# Patient Record
Sex: FEMALE | URBAN - NONMETROPOLITAN AREA
[De-identification: names, ages, dates, MRNs, and addresses within clinical notes are randomized per-mention and may not be internally consistent; named-entity substitution may affect disease eponyms.]

---

## 2020-04-15 ENCOUNTER — APPOINTMENT (OUTPATIENT)
Age: 35
Setting detail: DERMATOLOGY
End: 2020-04-15

## 2020-04-15 DIAGNOSIS — L259 CONTACT DERMATITIS AND OTHER ECZEMA, UNSPECIFIED CAUSE: ICD-10-CM

## 2020-04-15 DIAGNOSIS — L28.0 LICHEN SIMPLEX CHRONICUS: ICD-10-CM

## 2020-04-15 PROBLEM — L23.9 ALLERGIC CONTACT DERMATITIS, UNSPECIFIED CAUSE: Status: ACTIVE | Noted: 2020-04-15

## 2020-04-15 PROCEDURE — OTHER PRESCRIPTION: OTHER

## 2020-04-15 PROCEDURE — OTHER COUNSELING: OTHER

## 2020-04-15 PROCEDURE — 99201: CPT

## 2020-04-15 RX ORDER — ALCLOMETASONE DIPROPIONATE 0.5 MG/G
CREAM TOPICAL
Qty: 1 | Refills: 0 | Status: ERX | COMMUNITY
Start: 2020-04-15

## 2020-04-15 RX ORDER — BETAMETHASONE VALERATE 1 MG/G
CREAM TOPICAL
Qty: 1 | Refills: 1 | Status: ERX | COMMUNITY
Start: 2020-04-15

## 2020-04-17 ENCOUNTER — RX ONLY (RX ONLY)
Age: 35
End: 2020-04-17

## 2020-04-17 RX ORDER — ALCLOMETASONE DIPROPIONATE 0.5 MG/G
CREAM TOPICAL
Qty: 1 | Refills: 1 | Status: ERX

## 2020-04-17 RX ORDER — BETAMETHASONE DIPROPIONATE 0.5 MG/G
CREAM TOPICAL
Qty: 1 | Refills: 1 | Status: ERX | COMMUNITY
Start: 2020-04-17

## 2020-06-26 ENCOUNTER — HOSPITAL ENCOUNTER (OUTPATIENT)
Dept: HOSPITAL 47 - OR | Age: 35
Discharge: HOME | End: 2020-06-26
Attending: OBSTETRICS & GYNECOLOGY
Payer: COMMERCIAL

## 2020-06-26 VITALS — DIASTOLIC BLOOD PRESSURE: 79 MMHG | HEART RATE: 84 BPM | SYSTOLIC BLOOD PRESSURE: 110 MMHG

## 2020-06-26 VITALS — BODY MASS INDEX: 35.4 KG/M2

## 2020-06-26 VITALS — TEMPERATURE: 97.6 F

## 2020-06-26 VITALS — RESPIRATION RATE: 16 BRPM

## 2020-06-26 DIAGNOSIS — G47.30: ICD-10-CM

## 2020-06-26 DIAGNOSIS — E28.2: ICD-10-CM

## 2020-06-26 DIAGNOSIS — J45.909: ICD-10-CM

## 2020-06-26 DIAGNOSIS — O02.1: Primary | ICD-10-CM

## 2020-06-26 DIAGNOSIS — Z82.49: ICD-10-CM

## 2020-06-26 DIAGNOSIS — Z79.899: ICD-10-CM

## 2020-06-26 DIAGNOSIS — Z98.890: ICD-10-CM

## 2020-06-26 DIAGNOSIS — Z90.49: ICD-10-CM

## 2020-06-26 DIAGNOSIS — G25.81: ICD-10-CM

## 2020-06-26 DIAGNOSIS — Z88.6: ICD-10-CM

## 2020-06-26 LAB
BASOPHILS # BLD AUTO: 0.1 K/UL (ref 0–0.2)
BASOPHILS NFR BLD AUTO: 1 %
EOSINOPHIL # BLD AUTO: 0.1 K/UL (ref 0–0.7)
EOSINOPHIL NFR BLD AUTO: 1 %
ERYTHROCYTE [DISTWIDTH] IN BLOOD BY AUTOMATED COUNT: 5.13 M/UL (ref 3.8–5.4)
ERYTHROCYTE [DISTWIDTH] IN BLOOD: 12.6 % (ref 11.5–15.5)
HCT VFR BLD AUTO: 43 % (ref 34–46)
HGB BLD-MCNC: 14.3 GM/DL (ref 11.4–16)
LYMPHOCYTES # SPEC AUTO: 2 K/UL (ref 1–4.8)
LYMPHOCYTES NFR SPEC AUTO: 25 %
MCH RBC QN AUTO: 27.9 PG (ref 25–35)
MCHC RBC AUTO-ENTMCNC: 33.2 G/DL (ref 31–37)
MCV RBC AUTO: 84 FL (ref 80–100)
MONOCYTES # BLD AUTO: 0.4 K/UL (ref 0–1)
MONOCYTES NFR BLD AUTO: 5 %
NEUTROPHILS # BLD AUTO: 5.3 K/UL (ref 1.3–7.7)
NEUTROPHILS NFR BLD AUTO: 66 %
PLATELET # BLD AUTO: 268 K/UL (ref 150–450)
WBC # BLD AUTO: 8.1 K/UL (ref 3.8–10.6)

## 2020-06-26 PROCEDURE — 88305 TISSUE EXAM BY PATHOLOGIST: CPT

## 2020-06-26 PROCEDURE — 59820 CARE OF MISCARRIAGE: CPT

## 2020-06-26 PROCEDURE — 85025 COMPLETE CBC W/AUTO DIFF WBC: CPT

## 2020-06-26 NOTE — P.OP
Date of Procedure: 20


Preoperative Diagnosis: 


#1.  8+ week missed 


Postoperative Diagnosis: 


Same


Procedure(s) Performed: 


#1.  Dilation and aspiration curettage


Anesthesia: MAYLIN


Surgeon: Musa Warner


Estimated Blood Loss (ml): 200


IV fluids (ml): 700


Urine output (ml): 20


Pathology: other (Endometrial contents)


Condition: stable


Disposition: PACU


Operative Findings: 


Preoperative pelvic examination demonstrated a roughly 9 week slightly retr

overted mobile normal shaped uterus.  There was also noted to be fairly 

significant prolapse with the cervix at just under grade 3 descensus.  The 

uterus sounded to 11 cm.  The #9 curved aspiration curet was utilized and tissue

was clearly seen passing through the tubing.  There was no tissue with a sharp 

curette or on the final pass of the aspiration curet and the typical gritty 

texture was encountered throughout.  The patient is an excellent candidate for 

vaginal instructed he should it ever become necessary.


Description of Procedure: 


The patient was prepped and draped in usual fashion after general endotracheal 

anesthesia was administered by the anesthesiologist.  A weighted speculum was 

placed and the bladder drained of approximately 20 mL of clear jose urine.  A 

single-tooth tenaculum was applied to the anterior lip of the cervix and uterus 

sounded to 11 cm as noted above.  Serial dilation was carried out to admit a #9 

curved aspiration curet which was placed to the fundus of the uterus and suction

applied.  After adequate suction had been built, thorough circumferential 

aspiration curettage was carried out from fundus to cervix with tissue clearly s

een passing through the tubing.  A second pass was made with the aspiration 

curet at which time no tissue seen passing through the tubing.  It was then set 

aside for moment in favor of a medium sharp curet which was utilized to 

thoroughly and circumferentially curet the in vitro cavity with the typical 

gritty texture encountered throughout.  No tissue was returned.  One last pass 

was made with the aspiration curet was time no further tissue was noted.  All 

instrumentation was removed.  The uterus was appreciably smaller following the 

procedure.  There was no ongoing bleeding from the cervix.  Estimated blood loss

for the case is approximately 200 mL.  There were no complications.  All sponge,

instrument, and needle counts were correct.  The patient tolerated the procedure

well and proceeded to the recovery room in stable condition.

## 2021-05-24 ENCOUNTER — HOSPITAL ENCOUNTER (OUTPATIENT)
Dept: HOSPITAL 47 - FBPOP | Age: 36
Discharge: HOME | End: 2021-05-24
Attending: OBSTETRICS & GYNECOLOGY
Payer: COMMERCIAL

## 2021-05-24 VITALS
DIASTOLIC BLOOD PRESSURE: 79 MMHG | HEART RATE: 112 BPM | RESPIRATION RATE: 16 BRPM | SYSTOLIC BLOOD PRESSURE: 133 MMHG | TEMPERATURE: 98.4 F

## 2021-05-24 DIAGNOSIS — Z3A.32: ICD-10-CM

## 2021-05-24 DIAGNOSIS — O26.93: Primary | ICD-10-CM

## 2021-05-24 PROCEDURE — 59025 FETAL NON-STRESS TEST: CPT

## 2021-05-24 PROCEDURE — 99213 OFFICE O/P EST LOW 20 MIN: CPT

## 2021-06-20 NOTE — P.MSEPDOC
Presenting Problems





- Arrival Data


Date of Arrival on Unit: 21


Time of Arrival on Unit: 10:15


Mode of Transport: Ambulatory





- Complaint


OB-Reason for Admission/Chief Complaint: NST





Prenatal Medical History





- Pregnancy Information


: 6


Para: 3


Term: 3


: 0


Abortions: Spontaneous or Elective: 2


Number of Living Children: 3





- Gestational Age


Gestational Age by SHAHAB (wks/days): 32 Weeks and 5 Days





- Prenatal History


Pregnancy Complications: GDM





Review of Systems





- Review of Systems


Constitutional: No problems


Breast: No problems


ENT: No problems


Cardiovascular: No problems


Respiratory: No problems


Gastrointestinal: No problems


Genitourinary: No problems


Musculoskeletal: No problems


Neurological: No problems


Skin: No problems





Vital Signs





- Temperature


Temperature: 98.4 F


Temperature Source: Oral





- Pulse


  ** Right Sitting


Pulse Rate: 112


Pulse Assessment Method: Automatic Cuff





- Respirations


Respiratory Rate: 16


Oxygen Delivery Method: Room Air





- Blood Pressure


  ** Right Arm


Blood Pressure: 133/79


Blood Pressure Mean: 97


Blood Pressure Source: Automatic Cuff





Medical Screen Scoring





- Fetal Assessment - Baby A


Baseline FHR: 130





Physician Notification





- Physician Notified


Physician Notified Date: 21


Physician Notified Time: 12:06


Physician: Radha Wilhelm Order Received: Yes (D/C home)





Disposition





- Disposition


OB Disposition: Discharge to home


Discharge Date: 21


Discharge Time: 12:06


I agree with the RN Medical Screening Exam: Yes


Physician's MSE Comment: 





I have neither seen nor examined the patient.


Case reviewed; plan agreed upon as documented in EMR&OBIX.: Yes


Diagnosis: PREGNANCY RELATED CONDITIONS, UNSPECIFIED, THIRD TRIMESTER

## 2021-07-12 ENCOUNTER — HOSPITAL ENCOUNTER (INPATIENT)
Dept: HOSPITAL 47 - 4FBP | Age: 36
LOS: 1 days | Discharge: HOME | End: 2021-07-13
Attending: OBSTETRICS & GYNECOLOGY | Admitting: OBSTETRICS & GYNECOLOGY
Payer: COMMERCIAL

## 2021-07-12 DIAGNOSIS — J45.909: ICD-10-CM

## 2021-07-12 DIAGNOSIS — O36.63X0: ICD-10-CM

## 2021-07-12 DIAGNOSIS — Z3A.39: ICD-10-CM

## 2021-07-12 DIAGNOSIS — O99.613: ICD-10-CM

## 2021-07-12 DIAGNOSIS — K21.9: ICD-10-CM

## 2021-07-12 DIAGNOSIS — O99.513: ICD-10-CM

## 2021-07-12 DIAGNOSIS — Z87.442: ICD-10-CM

## 2021-07-12 DIAGNOSIS — O40.3XX0: ICD-10-CM

## 2021-07-12 LAB
BASOPHILS # BLD AUTO: 0 K/UL (ref 0–0.2)
BASOPHILS NFR BLD AUTO: 0 %
EOSINOPHIL # BLD AUTO: 0.1 K/UL (ref 0–0.7)
EOSINOPHIL NFR BLD AUTO: 1 %
ERYTHROCYTE [DISTWIDTH] IN BLOOD BY AUTOMATED COUNT: 4.38 M/UL (ref 3.8–5.4)
ERYTHROCYTE [DISTWIDTH] IN BLOOD: 13.5 % (ref 11.5–15.5)
GLUCOSE BLD-MCNC: 80 MG/DL (ref 75–99)
GLUCOSE BLD-MCNC: 83 MG/DL (ref 75–99)
GLUCOSE BLD-MCNC: 83 MG/DL (ref 75–99)
GLUCOSE BLD-MCNC: 86 MG/DL (ref 75–99)
HCT VFR BLD AUTO: 34.3 % (ref 34–46)
HGB BLD-MCNC: 11.7 GM/DL (ref 11.4–16)
LYMPHOCYTES # SPEC AUTO: 2.3 K/UL (ref 1–4.8)
LYMPHOCYTES NFR SPEC AUTO: 23 %
MCH RBC QN AUTO: 26.7 PG (ref 25–35)
MCHC RBC AUTO-ENTMCNC: 34 G/DL (ref 31–37)
MCV RBC AUTO: 78.5 FL (ref 80–100)
MONOCYTES # BLD AUTO: 0.7 K/UL (ref 0–1)
MONOCYTES NFR BLD AUTO: 7 %
NEUTROPHILS # BLD AUTO: 6.6 K/UL (ref 1.3–7.7)
NEUTROPHILS NFR BLD AUTO: 67 %
PLATELET # BLD AUTO: 250 K/UL (ref 150–450)
WBC # BLD AUTO: 9.9 K/UL (ref 3.8–10.6)

## 2021-07-12 PROCEDURE — 85025 COMPLETE CBC W/AUTO DIFF WBC: CPT

## 2021-07-12 PROCEDURE — 86901 BLOOD TYPING SEROLOGIC RH(D): CPT

## 2021-07-12 PROCEDURE — 86850 RBC ANTIBODY SCREEN: CPT

## 2021-07-12 PROCEDURE — 3E033VJ INTRODUCTION OF OTHER HORMONE INTO PERIPHERAL VEIN, PERCUTANEOUS APPROACH: ICD-10-PCS

## 2021-07-12 PROCEDURE — 86900 BLOOD TYPING SEROLOGIC ABO: CPT

## 2021-07-12 PROCEDURE — 10907ZC DRAINAGE OF AMNIOTIC FLUID, THERAPEUTIC FROM PRODUCTS OF CONCEPTION, VIA NATURAL OR ARTIFICIAL OPENING: ICD-10-PCS

## 2021-07-12 RX ADMIN — DOCUSATE SODIUM AND SENNOSIDES SCH EACH: 50; 8.6 TABLET ORAL at 19:33

## 2021-07-12 RX ADMIN — POTASSIUM CHLORIDE SCH MLS/HR: 14.9 INJECTION, SOLUTION INTRAVENOUS at 13:12

## 2021-07-12 RX ADMIN — IBUPROFEN SCH: 600 TABLET ORAL at 18:33

## 2021-07-12 RX ADMIN — POTASSIUM CHLORIDE SCH MLS/HR: 14.9 INJECTION, SOLUTION INTRAVENOUS at 06:48

## 2021-07-12 RX ADMIN — ACETAMINOPHEN PRN MG: 325 TABLET, FILM COATED ORAL at 19:33

## 2021-07-12 NOTE — P.HPOB
History of Present Illness


H&P Date: 21


Chief Complaint: 39+ weeks, induction of labor





The patient is a 36-year-old  6 para 30-3 admitted at 39+ weeks as 

established by last menstrual period and confirmed by 9 week ultrasound.  She is

admitted for elective induction of labor with all signs reassuring.  Her 

pregnancy has been uncomplicated by advanced maternal age with the patient 

having declined  testing.  She additionally was found to be a 

gestational diabetic which ultimately required insulin to control but was well-

controlled throughout the pregnancy.  She has a history of nephrolithiasis with 

1 episode during the pregnancy as well.  She is additionally found to have the 

fetus at the 90th percentile growth in the third trimester and additionally had 

polyhydramnios.   testing throughout the pregnancy has been reassuring.

 Group B strep status is negative.





Obstetrical history  6 para 30-3 with 3 term vaginal deliveries and 2 

early miscarriages.  Current pregnancy statistics are listed in history present 

illness.  EDC of 2021 was established by last menstrual period and 

confirmed by 9 week ultrasound.  Laboratory workup demonstrates a blood type of 

O+ with a negative antibody screen.  Rubella status is immune.  Remainder of the

laboratory workup was within normal limits.  Early Glucola was elevated and 

followed by an abnormal three-hour glucose tolerance test making the diagnosis 

of gestational diabetes.  Group B strep status is negative.





Gynecologist history: Unremarkable with no history of any infections to include 

STDs.





Review of Systems





Review of systems is confined to history of present illness.





Past Medical History


Past Medical History: Asthma, GERD/Reflux


Additional Past Medical History / Comment(s): kidney stones,


History of Any Multi-Drug Resistant Organisms: None Reported


Past Surgical History: Adenoidectomy, Cholecystectomy, Ear Surgery


Additional Past Surgical History / Comment(s): BMT


Past Anesthesia/Blood Transfusion Reactions: Motion Sickness, Postoperative 

Nausea & Vomiting (PONV)


Past Psychological History: No Psychological Hx Reported


Smoking Status: Never smoker


Past Alcohol Use History: None Reported


Past Drug Use History: None Reported





- Past Family History


  ** Sister(s)


Family Medical History: Pulmonary Embolus


Additional Family Medical History / Comment(s): Stroke





  ** Father


Family Medical History: Cancer


Additional Family Medical History / Comment(s): Throat





Medications and Allergies


                                Home Medications











 Medication  Instructions  Recorded  Confirmed  Type


 


Insulin Aspart [NovoLOG] 0 units SQ ACHS 21 History


 


Insulin Glargine [Lantus] 12 unit SQ DAILY 21 History


 


Pnv No.95/Ferrous Fum/Folic AC 1 tab PO DAILY 21 History





[Prenatal Multivitamin Tablet]    








                                    Allergies











Allergy/AdvReac Type Severity Reaction Status Date / Time


 


NSAIDS (Non-Steroidal AdvReac  Abdominal Verified 21 07:00





Anti-Inflamma   Pain  














Exam


                                   Vital Signs











  Temp Pulse Resp BP Pulse Ox


 


 21 06:33  97.1 F L  104 H  16  132/72  99








                                Intake and Output











 21





 22:59 06:59 14:59


 


Other:   


 


  Weight  92.986 kg 














In general, this is a well-developed, well-nourished white female in no acute 

distress.  Her heart has a regular rhythm and rate without murmur.  Her lungs 

are clear to auscultation bilaterally in all fields.  Her abdomen is gravid, 

nondistended, has normal active bowel sounds, soft, nontender, and without any 

palpable masses aside from uterine fundus.  Her extremities without any 

cyanosis, clubbing, or significant edema and are nontender to palpation 

bilaterally.  Digital cervical examination demonstrates her cervix to be 3+ 

centimeters dilated, approximately 50% effaced, the vertex in presentation at -2

station.  Artificial rupture of membranes is carried out demonstrating clear to 

lightly meconium-stained fluid.





Results


Result Diagrams: 


                                 21 06:45





                  Abnormal Lab Results - Last 24 Hours (Table)











  21 Range/Units





  06:45 


 


MCV  78.5 L  (80.0-100.0)  fL














Assessment and Plan


(1) Polyhydramnios


Current Visit: Yes   Status: Acute   Code(s): O40.9XX0 - POLYHYDRAMNIOS, UNSP 

TRIMESTER, NOT APPLICABLE OR UNSP   SNOMED Code(s): 58870805


   





(2) Term pregnancy


Current Visit: Yes   Status: Acute   Code(s): Z34.90 - ENCNTR FOR SUPRVSN OF 

NORMAL PREGNANCY, UNSP, UNSP TRIMESTER   SNOMED Code(s): 14746018


   





(3) Fetal macrosomia in pregnancy


Current Visit: Yes   Status: Acute   Code(s): O36.60X0 - MATERNAL CARE FOR 

EXCESS FETAL GROWTH, UNSP TRIMESTER, UNSP   SNOMED Code(s): 96012829


   





(4) Gestational diabetes


Current Visit: Yes   Status: Acute   Code(s): O24.419 - GESTATIONAL DIABETES 

MELLITUS IN PREGNANCY, UNSP CONTROL   SNOMED Code(s): 62047465


   


Plan: 





The patient has had Pitocin augmentation started.  She has undergone artificial 

rupture of membranes.  She will have close maternal and fetal surveillance and 

expectant management will be practiced.  Given her diabetes, she will have blood

sugars monitored every 2 hours or closer necessary.  She is a good candidate for

either IV or epidural analgesia.  I would anticipate normal spontaneous vaginal 

delivery sometime this afternoon.

## 2021-07-12 NOTE — P.PROBDLV
Vaginal Delivery Note





- .


Vaginal Delivery Note: 





The patient is a 36-year-old  6 para 3023 admitted at 39-5/7 weeks by 

good dating parameters.  She is admitted for elective induction of labor with 

all signs reassuring.  Her pregnancy has been complicated by advanced maternal 

age though she declined any  trisomy testing.  She additionally was 

found to have gestational diabetes which ultimately required insulin.  

testing was reassuring throughout the third trimester.  Group E strep status is 

negative.  On labor and delivery, she had Pitocin started followed by artificial

rupture of membranes for clear fluid.  She made progress to the active phase of 

labor and had an epidural catheter placed for analgesia.  She then progressed 

through the active phase to complete.  She had a trial of pushing but was 

somewhat ineffective secondary to the density of the epidural.  As a result, the

epidural was discontinued and the patient began to push again.  She pushed over 

the course of approximately 45 minutes to a normal spontaneous vaginal delivery 

of a viable 9 lbs. 1 oz. baby boy with Apgars of 9 at 1 minute and 9 at 5 

minutes delivered in the left occiput anterior position.  The placenta was 

delivered spontaneously, intact, and grossly normal with a grossly normal three-

vessel cord inserted approximate 5 cm from the margin of the placental disc.  

There were no lacerations of the perineum, vagina, or cervix.  Estimated blood 

loss for the case was approximately 100 mL.  There were no complications.  All 

sponge, instrument, needle counts were correct.  Both mother and infant are 

resting comfortably in recovery.

## 2021-07-13 VITALS — HEART RATE: 71 BPM | DIASTOLIC BLOOD PRESSURE: 69 MMHG | SYSTOLIC BLOOD PRESSURE: 98 MMHG | RESPIRATION RATE: 18 BRPM

## 2021-07-13 VITALS — TEMPERATURE: 98 F

## 2021-07-13 LAB
BASOPHILS # BLD AUTO: 0 K/UL (ref 0–0.2)
BASOPHILS NFR BLD AUTO: 0 %
EOSINOPHIL # BLD AUTO: 0.1 K/UL (ref 0–0.7)
EOSINOPHIL NFR BLD AUTO: 1 %
ERYTHROCYTE [DISTWIDTH] IN BLOOD BY AUTOMATED COUNT: 3.98 M/UL (ref 3.8–5.4)
ERYTHROCYTE [DISTWIDTH] IN BLOOD: 13.6 % (ref 11.5–15.5)
HCT VFR BLD AUTO: 31.4 % (ref 34–46)
HGB BLD-MCNC: 10.8 GM/DL (ref 11.4–16)
LYMPHOCYTES # SPEC AUTO: 2.1 K/UL (ref 1–4.8)
LYMPHOCYTES NFR SPEC AUTO: 20 %
MCH RBC QN AUTO: 27.2 PG (ref 25–35)
MCHC RBC AUTO-ENTMCNC: 34.5 G/DL (ref 31–37)
MCV RBC AUTO: 78.9 FL (ref 80–100)
MONOCYTES # BLD AUTO: 0.8 K/UL (ref 0–1)
MONOCYTES NFR BLD AUTO: 7 %
NEUTROPHILS # BLD AUTO: 7.1 K/UL (ref 1.3–7.7)
NEUTROPHILS NFR BLD AUTO: 70 %
PLATELET # BLD AUTO: 227 K/UL (ref 150–450)
WBC # BLD AUTO: 10.2 K/UL (ref 3.8–10.6)

## 2021-07-13 RX ADMIN — ACETAMINOPHEN PRN MG: 325 TABLET, FILM COATED ORAL at 08:22

## 2021-07-13 RX ADMIN — IBUPROFEN SCH MG: 600 TABLET ORAL at 15:29

## 2021-07-13 RX ADMIN — DOCUSATE SODIUM AND SENNOSIDES SCH EACH: 50; 8.6 TABLET ORAL at 08:22

## 2021-07-13 RX ADMIN — IBUPROFEN SCH MG: 600 TABLET ORAL at 05:06

## 2021-07-13 NOTE — P.DS
Providers


Date of admission: 


21 06:22





Expected date of discharge: 21


Attending physician: 


Musa Warner





Primary care physician: 


Stated None








- Discharge Diagnosis(es)


(1) Polyhydramnios


Current Visit: Yes   Status: Acute   





(2) Term pregnancy


Current Visit: Yes   Status: Acute   





(3) Fetal macrosomia in pregnancy


Current Visit: Yes   Status: Acute   





(4) Gestational diabetes


Current Visit: Yes   Status: Acute   





(5) Normal spontaneous vaginal delivery


Current Visit: Yes   Status: Acute   


Hospital Course: 





The patient is a 36-year-old  6 para 3023 admitted at 39-5/7 weeks by 

good dating parameters.  She is admitted for elective induction with all signs 

reassuring.  Her pregnancy was complicated by advanced maternal age though she 

declined any  testing.  She was additionally found to be a gestational 

diabetic and ultimately required insulin for what was otherwise excellent cycle 

control.   testing was reassuring.  Group B strep status is negative.  

On labor and delivery, she had Pitocin started followed by artificial rupture of

membranes for clear fluid.  She made progress into the active phase and had an 

epidural catheter placed for analgesia.  She then progressed to complete and 

pushed to a normal spontaneous vaginal delivery of a viable 9 lbs. 1 oz. baby 

boy with Apgars of 9 at 1 minute and 9 at 5 minutes.  Her postpartum course was 

unremarkable vital signs remaining stable and her temperature was afebrile 

throughout.  She was deemed stable for discharge on postpartum day #1 was 

discharged home to follow-up in the office in 6 weeks' time routinely.  

Discharge instructions included calling for any significantly increased bleeding

or foul-smelling lochia, significantly increased fever abdominal pain, perineal 

complaints, breast complaints, or anything else that concerned her.  She was 

additionally instructed to have nothing in the vagina for at least 6 weeks time 

to include intercourse.  She understood her instructions and agrees to follow up

as noted above.  Discharge medications included continued prenatal vitamins as 

she has opted to breast-feed.  She was otherwise to use over-the-counter 

analgesic pain medications as needed.  Maternal blood type is O+ and rubella 

status is immune.


Procedures: 





#1.  Pitocin induction #2.  Artificial rupture of membranes #3.  Epidural 

analgesia #4.  Normal spontaneous vaginal delivery


Patient Condition at Discharge: Stable





Plan - Discharge Summary


New Discharge Prescriptions: 


No Action


   Insulin Glargine [Lantus] 12 unit SQ DAILY


   Insulin Aspart [NovoLOG] 0 units SQ ACHS


   Pnv No.95/Ferrous Fum/Folic AC [Prenatal Multivitamin Tablet] 1 tab PO DAILY


Discharge Medication List





Insulin Aspart [NovoLOG] 0 units SQ ACHS 21 [History]


Insulin Glargine [Lantus] 12 unit SQ DAILY 21 [History]


Pnv No.95/Ferrous Fum/Folic AC [Prenatal Multivitamin Tablet] 1 tab PO DAILY 

21 [History]








Follow up Appointment(s)/Referral(s): 


Musa Warner MD [STAFF PHYSICIAN] - 6 Weeks


Discharge Disposition: HOME SELF-CARE